# Patient Record
(demographics unavailable — no encounter records)

---

## 2025-01-22 NOTE — CONSULT LETTER
[Dear  ___] : Dear  [unfilled], [Courtesy Letter:] : I had the pleasure of seeing your patient, [unfilled], in my office today. [Consult Closing:] : Thank you very much for allowing me to participate in the care of this patient.  If you have any questions, please do not hesitate to contact me. [Sincerely,] : Sincerely, [FreeTextEntry3] : Jennifer Heard M.D.

## 2025-01-22 NOTE — PHYSICAL EXAM
[General Appearance - Alert] : alert [General Appearance - In No Acute Distress] : in no acute distress [Sclera] : the sclera and conjunctiva were normal [Outer Ear] : the ears and nose were normal in appearance [Neck Appearance] : the appearance of the neck was normal [] : no respiratory distress [Respiration, Rhythm And Depth] : normal respiratory rhythm and effort [Auscultation Breath Sounds / Voice Sounds] : lungs were clear to auscultation bilaterally [Heart Rate And Rhythm] : heart rate was normal and rhythm regular [Heart Sounds] : normal S1 and S2 [Murmurs] : no murmurs [Edema] : there was no peripheral edema [No CVA Tenderness] : no ~M costovertebral angle tenderness [Involuntary Movements] : no involuntary movements were seen [FreeTextEntry1] : uses wheelchair  [Oriented To Time, Place, And Person] : oriented to person, place, and time

## 2025-01-22 NOTE — ASSESSMENT
[FreeTextEntry1] : ============================ # CKD Stage III. # History of being hemodialysis dependent in 2013 due to ATN after STEMI with VSD rupture resulting in cardiogenic shock and bilateral AKA. - stable renal parameters - inc farxiga 10 mg qd - furosemide 20 mg qd - zocor 20 mg qd  # proteinuria - improved  - cw  enalapril 5 mg qd, fu bp   # t2DM